# Patient Record
Sex: FEMALE | Race: ASIAN | URBAN - METROPOLITAN AREA
[De-identification: names, ages, dates, MRNs, and addresses within clinical notes are randomized per-mention and may not be internally consistent; named-entity substitution may affect disease eponyms.]

---

## 2017-05-26 ENCOUNTER — OFFICE VISIT (OUTPATIENT)
Dept: FAMILY MEDICINE CLINIC | Age: 42
End: 2017-05-26

## 2017-05-26 VITALS
RESPIRATION RATE: 18 BRPM | TEMPERATURE: 97.4 F | OXYGEN SATURATION: 99 % | DIASTOLIC BLOOD PRESSURE: 71 MMHG | HEART RATE: 64 BPM | SYSTOLIC BLOOD PRESSURE: 107 MMHG | BODY MASS INDEX: 22.68 KG/M2 | HEIGHT: 63 IN | WEIGHT: 128 LBS

## 2017-05-26 DIAGNOSIS — L70.0 CYSTIC ACNE: ICD-10-CM

## 2017-05-26 DIAGNOSIS — M54.2 NECK PAIN: ICD-10-CM

## 2017-05-26 DIAGNOSIS — E03.9 ACQUIRED HYPOTHYROIDISM: Primary | ICD-10-CM

## 2017-05-26 RX ORDER — LEVOTHYROXINE SODIUM 50 UG/1
50 TABLET ORAL
Qty: 30 TAB | Refills: 1 | Status: SHIPPED | OUTPATIENT
Start: 2017-05-26 | End: 2017-06-18 | Stop reason: SDUPTHER

## 2017-05-26 RX ORDER — CLINDAMYCIN AND BENZOYL PEROXIDE 10; 50 MG/G; MG/G
GEL TOPICAL 2 TIMES DAILY
Qty: 1 TUBE | Refills: 3 | Status: SHIPPED | OUTPATIENT
Start: 2017-05-26

## 2017-05-26 RX ORDER — DICLOFENAC SODIUM 10 MG/G
GEL TOPICAL 4 TIMES DAILY
Qty: 100 G | Refills: 0 | Status: SHIPPED | OUTPATIENT
Start: 2017-05-26

## 2017-05-26 NOTE — PROGRESS NOTES
Chief Complaint   Patient presents with    Medication Refill     1. Have you been to the ER, urgent care clinic since your last visit? Hospitalized since your last visit? No    2. Have you seen or consulted any other health care providers outside of the [address] [place] [address] since your last visit? Include any pap smears or colon screening.  No

## 2017-05-26 NOTE — PROGRESS NOTES
Subjective  Lindy Ana Piña is an 38 y.o. female who presents for follow up:    Hypothyroid: compliant with Synthroid 50mcg daily, last TSH checked 11/2016 normal.      Acne: would like to change from Differin gel to Benzaclin. Daughter uses Benzaclin and pt thinks it may work better for her. Neck pain: Doesn't recall pulling a muscle but thinks she must have. Has tried OTC Nsaids and tylenol without relief. Feels achy. Sometimes radiates up towards her head causing an occipital headache. No numbness or tingling. Allergies - reviewed:   No Known Allergies      Medications - reviewed:   Current Outpatient Prescriptions   Medication Sig    levothyroxine (SYNTHROID) 50 mcg tablet Take 1 Tab by mouth Daily (before breakfast).  clindamycin-benzoyl peroxide (BENZACLIN) 1-5 % topical gel Apply  to affected area two (2) times a day. Apply to affected area after the skin has been cleansed and dried.  diclofenac (VOLTAREN) 1 % gel Apply  to affected area four (4) times daily.  SUMAtriptan (IMITREX) 50 mg tablet Take 1 Tab by mouth once as needed for Migraine.  butalbital-acetaminophen (PHRENILIN)  mg tablet Take 1 Tab by mouth every six (6) hours as needed. For migraine    ZOLMitriptan (ZOMIG) 2.5 mg tablet Take 1 Tab by mouth daily as needed. For migraine     No current facility-administered medications for this visit. Past Medical History - reviewed:  Past Medical History:   Diagnosis Date    Headache     Hypothyroidism          Past Surgical History - reviewed:   History reviewed. No pertinent surgical history. Social History - reviewed:  Social History     Social History    Marital status:      Spouse name: N/A    Number of children: N/A    Years of education: N/A     Occupational History    Not on file.      Social History Main Topics    Smoking status: Never Smoker    Smokeless tobacco: Not on file    Alcohol use No    Drug use: No    Sexual activity: Yes     Partners: Male     Birth control/ protection: None     Other Topics Concern    Not on file     Social History Narrative    [employer]    12and 12year olds         Family History - reviewed:  Family History   Problem Relation Age of Onset    Heart Disease Father          Immunizations - reviewed: There is no immunization history on file for this patient. ROS  CONSTITUTIONAL: Denies: fever, chills  CARDIOVASCULAR: Denies: chest pain, edema, palpitations  RESPIRATORY: Denies: shortness of breath, pleuritic chest pain  MUSCULOSKELETAL: joint pain, muscle pain      Physical Exam  Visit Vitals    /71 (BP 1 Location: Right arm, BP Patient Position: Sitting)    Pulse 64    Temp 97.4 °F (36.3 °C) (Oral)    Resp 18    Ht 5' 3\" (1.6 m)    Wt 128 lb (58.1 kg)    LMP 05/08/2017 (Approximate)    SpO2 99%    BMI 22.67 kg/m2       General appearance - alert, well appearing, and in no distress  Neck - supple, no significant adenopathy  Chest - clear to auscultation, no wheezes, rales or rhonchi, symmetric air entry  Heart - normal rate, regular rhythm, normal S1, S2, no murmurs, rubs, clicks or gallops  Neurological - alert, oriented, normal speech, no focal findings or movement disorder noted  Musculoskeletal - upper cervical paraspinal muscles are hypertrophic and tight, mildly spasmed, no spinous process TTP, normal ROM      Assessment/Plan    ICD-10-CM ICD-9-CM    1. Acquired hypothyroidism E03.9 244.9 TSH 3RD GENERATION      levothyroxine (SYNTHROID) 50 mcg tablet   2. Cystic acne L70.0 706.1 clindamycin-benzoyl peroxide (BENZACLIN) 1-5 % topical gel   3. Neck pain M54.2 723.1 diclofenac (VOLTAREN) 1 % gel     · Will check TSH and adjust medication as necessary   · Will change Differin gel to Benzaclin per pt's request for her acne  · Suspect neck pain 2/2 muscle strain. Has failed OTC NSAIDs, will try Diclofenac. Offered muscle relaxer but pt declined.   Home exercise program.      Follow-up Disposition: Not on File      I have discussed the diagnosis with the patient and the intended plan as seen in the above orders. The patient has received an after-visit summary and questions were answered concerning future plans. I have discussed medication side effects and warnings with the patient as well.       Danis Joe, DO

## 2017-06-16 ENCOUNTER — LAB ONLY (OUTPATIENT)
Dept: FAMILY MEDICINE CLINIC | Age: 42
End: 2017-06-16

## 2017-06-16 DIAGNOSIS — E03.9 ACQUIRED HYPOTHYROIDISM: ICD-10-CM

## 2017-06-17 LAB — TSH SERPL DL<=0.005 MIU/L-ACNC: 2.79 UIU/ML (ref 0.45–4.5)

## 2017-06-18 DIAGNOSIS — E03.9 ACQUIRED HYPOTHYROIDISM: ICD-10-CM

## 2017-06-18 RX ORDER — LEVOTHYROXINE SODIUM 50 UG/1
50 TABLET ORAL
Qty: 90 TAB | Refills: 3 | Status: SHIPPED | OUTPATIENT
Start: 2017-06-18 | End: 2018-08-24 | Stop reason: SDUPTHER

## 2017-11-17 ENCOUNTER — OFFICE VISIT (OUTPATIENT)
Dept: FAMILY MEDICINE CLINIC | Age: 42
End: 2017-11-17

## 2017-11-17 VITALS
SYSTOLIC BLOOD PRESSURE: 116 MMHG | HEIGHT: 63 IN | OXYGEN SATURATION: 100 % | WEIGHT: 123 LBS | HEART RATE: 58 BPM | RESPIRATION RATE: 16 BRPM | BODY MASS INDEX: 21.79 KG/M2 | DIASTOLIC BLOOD PRESSURE: 77 MMHG | TEMPERATURE: 98.4 F

## 2017-11-17 DIAGNOSIS — R50.9 FEVER, UNSPECIFIED FEVER CAUSE: ICD-10-CM

## 2017-11-17 DIAGNOSIS — G43.809 OTHER MIGRAINE WITHOUT STATUS MIGRAINOSUS, NOT INTRACTABLE: ICD-10-CM

## 2017-11-17 DIAGNOSIS — R52 BODY ACHES: Primary | ICD-10-CM

## 2017-11-17 DIAGNOSIS — R21 RASH: ICD-10-CM

## 2017-11-17 RX ORDER — CLOTRIMAZOLE AND BETAMETHASONE DIPROPIONATE 10; .64 MG/G; MG/G
CREAM TOPICAL
Qty: 15 G | Refills: 0 | Status: SHIPPED | OUTPATIENT
Start: 2017-11-17

## 2017-11-17 RX ORDER — SUMATRIPTAN 50 MG/1
50 TABLET, FILM COATED ORAL
Qty: 10 TAB | Refills: 1 | Status: SHIPPED | OUTPATIENT
Start: 2017-11-17 | End: 2019-04-09 | Stop reason: SDUPTHER

## 2017-11-17 RX ORDER — BUTALBITAL AND ACETAMINOPHEN 325; 50 MG/1; MG/1
1 TABLET ORAL
Qty: 10 TAB | Refills: 1 | Status: SHIPPED | OUTPATIENT
Start: 2017-11-17 | End: 2019-04-09 | Stop reason: SDUPTHER

## 2017-11-17 NOTE — PROGRESS NOTES
Chief Complaint:  Chief Complaint   Patient presents with    Headache    Fever     since last night        HPI  Chirag Gordon is a 38 y.o. female who presents for multiple concerns. Temp at home 100 yesterday and today when checked underneath the arm  Also having some mild muscle aches throughout body  Denies other sxs  Sick contacts: none known  Got the flu shot 2 days ago and thinks this may be why  Needs note for missing work today  No vomiting, able to drink well    Has rash on her left chest and right arm. The one on chest is more chronic. The one on right arm is relatively new. It is itchy  Otc steroid cream hasn't made it go away    Migraines for years. Needs med refill. Starts in back of head and goes toward front  Lights and sound bothersome  No aura  Has N/V associated at times  Migraines are at typical frequency, hasn't gotten worse  Takes phrenilin 1x per 1-2 weeks, which helps prevent migraine from getting worse  Without taking the phrenilin, migraine gets worse and needs sumatriptan  Never uses tylenol or ibuprofen because they don't help  Used imitrex 2-3x in the last 3 months    ROS:   No fever  No dizziness or confusion  No vision changes, No sore throat, no congestion or runny nose  No cough or SOB  No chest pain  No abdominal pain, N/V, constipation, diarrhea    Allergies:   No Known Allergies    Meds:   Current Outpatient Prescriptions   Medication Sig Dispense Refill    SUMAtriptan (IMITREX) 50 mg tablet Take 1 Tab by mouth once as needed for Migraine. 10 Tab 1    butalbital-acetaminophen (PHRENILIN)  mg tablet Take 1 Tab by mouth every six (6) hours as needed. For migraine 10 Tab 1    clotrimazole-betamethasone (LOTRISONE) topical cream Apply thin layer twice daily for up to 2 weeks 15 g 0    levothyroxine (SYNTHROID) 50 mcg tablet Take 1 Tab by mouth Daily (before breakfast).  90 Tab 3    clindamycin-benzoyl peroxide (BENZACLIN) 1-5 % topical gel Apply  to affected area two (2) times a day. Apply to affected area after the skin has been cleansed and dried. 1 Tube 3    diclofenac (VOLTAREN) 1 % gel Apply  to affected area four (4) times daily. 100 g 0       PMH: reviewed  Past Medical History:   Diagnosis Date    Headache     Hypothyroidism        SH: reviewed  Smoker:  History   Smoking Status    Never Smoker   Smokeless Tobacco    Not on file       ETOH:   History   Alcohol Use No       Physical Exam:  Visit Vitals    /77 (BP 1 Location: Left arm, BP Patient Position: Sitting)    Pulse (!) 58    Temp 98.4 °F (36.9 °C) (Oral)    Resp 16    Ht 5' 3\" (1.6 m)    Wt 123 lb (55.8 kg)    SpO2 100%    BMI 21.79 kg/m2     Gen: No apparent distress. Pleasant. HEENT: Normocephalic, pupils equally round and reactive, extraocular eye movements intact, hearing grossly normal bilaterally, bilateral TMs normal, nose normal and patent with no erythema, mucous membranes moist, pharynx normal without lesions  Neck: Supple, no lymph nodes, masses, or thyromegaly appreciated  Lungs: Respirations unlabored, clear to auscultation bilaterally  Cardio: Regular rate and rhythm without murmurs, rubs, or gallops   Abdomen: Normoactive bowel sounds, soft, nontender, nondistended  Ext: No peripheral edema, erythema, or tenderness  Skin: Warm and dry, there are two dry, mildly erythematous patches on skin, one is approx 1-cm and circular over inner aspect of right upper arm, one is approx 1.5-cm and circular near left clavicle  Neuro: Alert and responds to all questions appropriately. CN 2-12, sensation, strength, gait grossly intact. Assessment and Plan:     Encounter Diagnoses:    ICD-10-CM ICD-9-CM    1. Body aches R52 780.96    2. Fever, unspecified fever cause R50.9 780.60    3. Rash R21 782.1    4. Other migraine without status migrainosus, not intractable G43.809 346.80      1-2. Likely viral vs SE of flu vaccine. TSH wnl in June 2017.  Afebrile, VSS, reassuring exam. Supportive care recommended, including adequate hydration and tylenol/motrin prn. Rtc prn. Work note provided. 3. Trial lotrisone. 4. Migraines stable. Refilled current prescription. Would not recommend phrenilin indefinitely, however. Reviewed that she may benefit from alternate regimen and that another appt would be needed to discuss beyond this refill. Rtc prn if migraine intensity or frequency worsens or if she is using phrenilin frequently (last rx written August 2016). Discussed diagnoses in detail with patient. Patient expressed understanding of and agreement to above plan. All questions and concerns addressed. Medication risks/benefits/side effects discussed with patient. Patient is counseled to return to the office if symptoms do not improve as expected.       Roosevelt Buenrostro MD  Family Medicine Resident, PGY-3

## 2017-11-17 NOTE — MR AVS SNAPSHOT
Visit Information Date & Time Provider Department Dept. Phone Encounter #  
 11/17/2017  2:45 PM Brent Srivastava, Paresh5 [facility] [phone] [id number] Follow-up Instructions Return if symptoms worsen or fail to improve. Upcoming Health Maintenance Date Due DTaP/Tdap/Td series (1 - Tdap) 3/9/1998 PAP AKA CERVICAL CYTOLOGY 3/9/1998 Influenza Age 5 to Adult 8/1/2017 Allergies as of 11/17/2017  Review Complete On: 11/17/2017 By: Brent Srivastava MD  
 No Known Allergies Current Immunizations  Never Reviewed No immunizations on file. Not reviewed this visit You Were Diagnosed With   
  
 Codes Comments Body aches    -  Primary ICD-10-CM: V00 ICD-9-CM: 780.96 Rash     ICD-10-CM: R21 
ICD-9-CM: 782.1 Other migraine without status migrainosus, not intractable     ICD-10-CM: W23.757 ICD-9-CM: 346.80 Vitals BP Pulse Temp Resp Height(growth percentile) Weight(growth percentile) 116/77 (BP 1 Location: Left arm, BP Patient Position: Sitting) (!) 58 98.4 °F (36.9 °C) (Oral) 16 5' 3\" (1.6 m) 123 lb (55.8 kg) SpO2 BMI OB Status Smoking Status 100% 21.79 kg/m2 Having regular periods Never Smoker Vitals History BMI and BSA Data Body Mass Index Body Surface Area 21.79 kg/m 2 1.57 m 2 Preferred Pharmacy Pharmacy Name Phone [facility] [address] AT  [address] [phone] Your Updated Medication List  
  
   
This list is accurate as of: 11/17/17  4:03 PM.  Always use your most recent med list.  
  
  
  
  
 butalbital-acetaminophen  mg tablet Commonly known as:  Arina Bumps Take 1 Tab by mouth every six (6) hours as needed. For migraine  
  
 clindamycin-benzoyl peroxide 1-5 % topical gel Commonly known as:  [place] Apply  to affected area two (2) times a day.  Apply to affected area after the skin has been cleansed and dried. clotrimazole-betamethasone topical cream  
Commonly known as:  Hope Lipa Apply thin layer twice daily for up to 2 weeks  
  
 diclofenac 1 % Gel Commonly known as:  VOLTAREN Apply  to affected area four (4) times daily. levothyroxine 50 mcg tablet Commonly known as:  synthroid Take 1 Tab by mouth Daily (before breakfast). SUMAtriptan 50 mg tablet Commonly known as:  IMITREX Take 1 Tab by mouth once as needed for Migraine. Prescriptions Sent to Pharmacy Refills SUMAtriptan (IMITREX) 50 mg tablet 1 Sig: Take 1 Tab by mouth once as needed for Migraine. Class: Normal  
 Pharmacy: [place] [address] Ph #: [phone] Route: Oral  
 butalbital-acetaminophen (PHRENILIN)  mg tablet 1 Sig: Take 1 Tab by mouth every six (6) hours as needed. For migraine Class: Normal  
 Pharmacy: [place] [address] Ph #: [phone] Route: Oral  
 clotrimazole-betamethasone (LOTRISONE) topical cream 0 Sig: Apply thin layer twice daily for up to 2 weeks Class: Normal  
 Pharmacy: [place] [address] Ph #: [phone] Follow-up Instructions Return if symptoms worsen or fail to improve. Patient Instructions Viral Infections: Care Instructions Your Care Instructions You don't feel well, but it's not clear what's causing it. You may have a viral infection. Viruses cause many illnesses, such as the common cold, influenza, fever, rashes, and the diarrhea, nausea, and vomiting that are often called \"stomach flu. \" You may wonder if antibiotic medicines could make you feel better. But antibiotics only treat infections caused by bacteria. They don't work on viruses. The good news is that viral infections usually aren't serious. Most will go away in a few days without medical treatment. In the meantime, there are a few things you can do to make yourself more comfortable. Follow-up care is a key part of your treatment and safety. Be sure to make and go to all appointments, and call your doctor if you are having problems. It's also a good idea to know your test results and keep a list of the medicines you take. How can you care for yourself at home? · Get plenty of rest if you feel tired. · Take an over-the-counter pain medicine if needed, such as acetaminophen (Tylenol), ibuprofen (Advil, Motrin), or naproxen (Aleve). Read and follow all instructions on the label. · Be careful when taking over-the-counter cold or flu medicines and Tylenol at the same time. Many of these medicines have acetaminophen, which is Tylenol. Read the labels to make sure that you are not taking more than the recommended dose. Too much acetaminophen (Tylenol) can be harmful. · Drink plenty of fluids, enough so that your urine is light yellow or clear like water. If you have kidney, heart, or liver disease and have to limit fluids, talk with your doctor before you increase the amount of fluids you drink. · Stay home from work, school, and other public places while you have a fever. When should you call for help? Call 911 anytime you think you may need emergency care. For example, call if: 
? · You have severe trouble breathing. ? · You passed out (lost consciousness). ?Call your doctor now or seek immediate medical care if: 
? · You seem to be getting much sicker. ? · You have a new or higher fever. ? · You have blood in your stools. ? · You have new belly pain, or your pain gets worse. ? · You have a new rash. ? Watch closely for changes in your health, and be sure to contact your doctor if: 
? · You start to get better and then get worse. ? · You do not get better as expected. Where can you learn more? Go to http://anabella-breana.info/. Enter T073 in the search box to learn more about \"Viral Infections: Care Instructions. \" Current as of: March 3, 2017 Content Version: 11.4 © 3228-8779 [employer]. Care instructions adapted under license by [employer] (which disclaims liability or warranty for this information). If you have questions about a medical condition or this instruction, always ask your healthcare professional. [address] any warranty or liability for your use of this information. Introducing [facility]! Dear Dwain Dela Cruz: Thank you for requesting a [employer] account. Our records indicate that you already have an active [employer] account. You can access your account anytime at [email or web address] Did you know that you can access your hospital and ER discharge instructions at any time in [employer]? You can also review all of your test results from your hospital stay or ER visit. Additional Information If you have questions, please visit the Frequently Asked Questions section of the [employer] website at [email or web address] Remember, [employer] is NOT to be used for urgent needs. For medical emergencies, dial 911. Now available from your iPhone and Android! Please provide this summary of care documentation to your next provider. Your primary care clinician is listed as Beau Cross. If you have any questions after today's visit, please call [phone].

## 2017-11-17 NOTE — PATIENT INSTRUCTIONS
Viral Infections: Care Instructions  Your Care Instructions    You don't feel well, but it's not clear what's causing it. You may have a viral infection. Viruses cause many illnesses, such as the common cold, influenza, fever, rashes, and the diarrhea, nausea, and vomiting that are often called \"stomach flu. \" You may wonder if antibiotic medicines could make you feel better. But antibiotics only treat infections caused by bacteria. They don't work on viruses. The good news is that viral infections usually aren't serious. Most will go away in a few days without medical treatment. In the meantime, there are a few things you can do to make yourself more comfortable. Follow-up care is a key part of your treatment and safety. Be sure to make and go to all appointments, and call your doctor if you are having problems. It's also a good idea to know your test results and keep a list of the medicines you take. How can you care for yourself at home? · Get plenty of rest if you feel tired. · Take an over-the-counter pain medicine if needed, such as acetaminophen (Tylenol), ibuprofen (Advil, Motrin), or naproxen (Aleve). Read and follow all instructions on the label. · Be careful when taking over-the-counter cold or flu medicines and Tylenol at the same time. Many of these medicines have acetaminophen, which is Tylenol. Read the labels to make sure that you are not taking more than the recommended dose. Too much acetaminophen (Tylenol) can be harmful. · Drink plenty of fluids, enough so that your urine is light yellow or clear like water. If you have kidney, heart, or liver disease and have to limit fluids, talk with your doctor before you increase the amount of fluids you drink. · Stay home from work, school, and other public places while you have a fever. When should you call for help? Call 911 anytime you think you may need emergency care. For example, call if:  ? · You have severe trouble breathing.    ? · You passed out (lost consciousness). ?Call your doctor now or seek immediate medical care if:  ? · You seem to be getting much sicker. ? · You have a new or higher fever. ? · You have blood in your stools. ? · You have new belly pain, or your pain gets worse. ? · You have a new rash. ? Watch closely for changes in your health, and be sure to contact your doctor if:  ? · You start to get better and then get worse. ? · You do not get better as expected. Where can you learn more? Go to http://anabella-breana.info/. Enter M674 in the search box to learn more about \"Viral Infections: Care Instructions. \"  Current as of: March 3, 2017  Content Version: 11.4  © 8903-7258 [employer]. Care instructions adapted under license by [employer] (which disclaims liability or warranty for this information). If you have questions about a medical condition or this instruction, always ask your healthcare professional. [address] any warranty or liability for your use of this information.

## 2017-11-17 NOTE — LETTER
NOTIFICATION RETURN TO WORK / SCHOOL 
 
11/17/2017 3:47 PM 
 
Ms. Lucas Grant [address] [place] [phone] To Whom It May Concern: 
 
Lucas Grant is currently under the care of [facility]. Please excuse her absence on 11/17/17. She will return to work on 11/20/17. If there are questions or concerns please have the patient contact our office. Sincerely, Quin Roca MD

## 2017-11-28 ENCOUNTER — OFFICE VISIT (OUTPATIENT)
Dept: FAMILY MEDICINE CLINIC | Age: 42
End: 2017-11-28

## 2017-11-28 VITALS
TEMPERATURE: 98.1 F | RESPIRATION RATE: 18 BRPM | WEIGHT: 122 LBS | HEIGHT: 63 IN | HEART RATE: 54 BPM | BODY MASS INDEX: 21.62 KG/M2 | OXYGEN SATURATION: 98 % | DIASTOLIC BLOOD PRESSURE: 71 MMHG | SYSTOLIC BLOOD PRESSURE: 118 MMHG

## 2017-11-28 DIAGNOSIS — R35.0 URINARY FREQUENCY: Primary | ICD-10-CM

## 2017-11-28 DIAGNOSIS — R21 SKIN RASH: ICD-10-CM

## 2017-11-28 DIAGNOSIS — N30.01 ACUTE CYSTITIS WITH HEMATURIA: ICD-10-CM

## 2017-11-28 LAB
BILIRUB UR QL STRIP: NEGATIVE
GLUCOSE UR-MCNC: NEGATIVE MG/DL
HCG URINE, QL. (POC): NEGATIVE
KETONES P FAST UR STRIP-MCNC: NEGATIVE MG/DL
PH UR STRIP: 7 [PH] (ref 4.6–8)
PROT UR QL STRIP: NEGATIVE
SP GR UR STRIP: 1.01 (ref 1–1.03)
UA UROBILINOGEN AMB POC: NORMAL (ref 0.2–1)
URINALYSIS CLARITY POC: NORMAL
URINALYSIS COLOR POC: NORMAL
URINE BLOOD POC: NORMAL
URINE LEUKOCYTES POC: NORMAL
URINE NITRITES POC: POSITIVE
VALID INTERNAL CONTROL?: YES

## 2017-11-28 RX ORDER — CIPROFLOXACIN 250 MG/1
250 TABLET, FILM COATED ORAL EVERY 12 HOURS
Qty: 6 TAB | Refills: 0 | Status: SHIPPED | OUTPATIENT
Start: 2017-11-28 | End: 2017-12-01

## 2017-11-28 NOTE — PROGRESS NOTES
Chief Complaint   Patient presents with    Urinary Burning     x 3 days    Rash     x 3 weeks     1. Have you been to the ER, urgent care clinic since your last visit? Hospitalized since your last visit? No    2. Have you seen or consulted any other health care providers outside of the [address] [place] [address] since your last visit? Include any pap smears or colon screening.  No

## 2017-11-28 NOTE — MR AVS SNAPSHOT
Visit Information Date & Time Provider Department Dept. Phone Encounter #  
 11/28/2017  4:00 PM Sher Pal, [facility] [phone] [id number] Follow-up Instructions Return if symptoms worsen or fail to improve. Upcoming Health Maintenance Date Due  
 PAP AKA CERVICAL CYTOLOGY 11/28/2018* DTaP/Tdap/Td series (2 - Td) 11/28/2027 *Topic was postponed. The date shown is not the original due date. Allergies as of 11/28/2017  Review Complete On: 11/28/2017 By: Emely Reyna No Known Allergies Current Immunizations  Never Reviewed No immunizations on file. Not reviewed this visit You Were Diagnosed With   
  
 Codes Comments Urinary frequency    -  Primary ICD-10-CM: R35.0 ICD-9-CM: 788.41 Acute cystitis with hematuria     ICD-10-CM: N30.01 
ICD-9-CM: 595.0 Skin rash     ICD-10-CM: R21 
ICD-9-CM: 782.1 Vitals BP Pulse Temp Resp Height(growth percentile) Weight(growth percentile) 118/71 (BP 1 Location: Right arm, BP Patient Position: Sitting) (!) 54 98.1 °F (36.7 °C) (Oral) 18 5' 3\" (1.6 m) 122 lb (55.3 kg) LMP SpO2 BMI OB Status Smoking Status 11/22/2017 (Approximate) 98% 21.61 kg/m2 Unknown Never Smoker BMI and BSA Data Body Mass Index Body Surface Area  
 21.61 kg/m 2 1.57 m 2 Preferred Pharmacy Pharmacy Name Phone [facility] [address] AT  [address] [phone] Your Updated Medication List  
  
   
This list is accurate as of: 11/28/17  4:47 PM.  Always use your most recent med list.  
  
  
  
  
 butalbital-acetaminophen  mg tablet Commonly known as:  Travon Divine Take 1 Tab by mouth every six (6) hours as needed. For migraine  
  
 ciprofloxacin HCl 250 mg tablet Commonly known as:  CIPRO Take 1 Tab by mouth every twelve (12) hours for 3 days. clindamycin-benzoyl peroxide 1-5 % topical gel Commonly known as:  Sebastian Roche Apply  to affected area two (2) times a day. Apply to affected area after the skin has been cleansed and dried. clotrimazole-betamethasone topical cream  
Commonly known as:  Hope Lipa Apply thin layer twice daily for up to 2 weeks  
  
 diclofenac 1 % Gel Commonly known as:  VOLTAREN Apply  to affected area four (4) times daily. levothyroxine 50 mcg tablet Commonly known as:  synthroid Take 1 Tab by mouth Daily (before breakfast). SUMAtriptan 50 mg tablet Commonly known as:  IMITREX Take 1 Tab by mouth once as needed for Migraine. Prescriptions Sent to Pharmacy Refills  
 ciprofloxacin HCl (CIPRO) 250 mg tablet 0 Sig: Take 1 Tab by mouth every twelve (12) hours for 3 days. Class: Normal  
 Pharmacy: [facility] AT [address] #: [phone] Route: Oral  
  
We Performed the Following AMB POC URINALYSIS DIP STICK AUTO W/O MICRO [27255 CPT(R)] AMB POC URINE PREGNANCY TEST, VISUAL COLOR COMPARISON [60619 CPT(R)] Follow-up Instructions Return if symptoms worsen or fail to improve. Patient Instructions Urinary Tract Infection in Women: Care Instructions Your Care Instructions A urinary tract infection, or UTI, is a general term for an infection anywhere between the kidneys and the urethra (where urine comes out). Most UTIs are bladder infections. They often cause pain or burning when you urinate. UTIs are caused by bacteria and can be cured with antibiotics. Be sure to complete your treatment so that the infection goes away. Follow-up care is a key part of your treatment and safety. Be sure to make and go to all appointments, and call your doctor if you are having problems. It's also a good idea to know your test results and keep a list of the medicines you take. How can you care for yourself at home? · Take your antibiotics as directed. Do not stop taking them just because you feel better. You need to take the full course of antibiotics. · Drink extra water and other fluids for the next day or two. This may help wash out the bacteria that are causing the infection. (If you have kidney, heart, or liver disease and have to limit fluids, talk with your doctor before you increase your fluid intake.) · Avoid drinks that are carbonated or have caffeine. They can irritate the bladder. · Urinate often. Try to empty your bladder each time. · To relieve pain, take a hot bath or lay a heating pad set on low over your lower belly or genital area. Never go to sleep with a heating pad in place. To prevent UTIs · Drink plenty of water each day. This helps you urinate often, which clears bacteria from your system. (If you have kidney, heart, or liver disease and have to limit fluids, talk with your doctor before you increase your fluid intake.) · Urinate when you need to. · Urinate right after you have sex. · Change sanitary pads often. · Avoid douches, bubble baths, feminine hygiene sprays, and other feminine hygiene products that have deodorants. · After going to the bathroom, wipe from front to back. When should you call for help? Call your doctor now or seek immediate medical care if: 
? · Symptoms such as fever, chills, nausea, or vomiting get worse or appear for the first time. ? · You have new pain in your back just below your rib cage. This is called flank pain. ? · There is new blood or pus in your urine. ? · You have any problems with your antibiotic medicine. ? Watch closely for changes in your health, and be sure to contact your doctor if: 
? · You are not getting better after taking an antibiotic for 2 days. ? · Your symptoms go away but then come back. Where can you learn more? Go to http://anabella-breana.info/. Enter S550 in the search box to learn more about \"Urinary Tract Infection in Women: Care Instructions. \" Current as of: May 12, 2017 Content Version: 11.4 © 5107-9512 YesVideo. Care instructions adapted under license by [employer] (which disclaims liability or warranty for this information). If you have questions about a medical condition or this instruction, always ask your healthcare professional. [address] any warranty or liability for your use of this information. Introducing hospitals & HEALTH SERVICES! Dear Julianne Saenz: Thank you for requesting a Extraprise account. Our records indicate that you already have an active Extraprise account. You can access your account anytime at [email or web address] Did you know that you can access your hospital and ER discharge instructions at any time in Extraprise? You can also review all of your test results from your hospital stay or ER visit. Additional Information If you have questions, please visit the Frequently Asked Questions section of the Extraprise website at [email or web address] Remember, Extraprise is NOT to be used for urgent needs. For medical emergencies, dial 911. Now available from your iPhone and Android! Please provide this summary of care documentation to your next provider. Your primary care clinician is listed as Mendel Restrepo. If you have any questions after today's visit, please call [phone].

## 2017-11-28 NOTE — PATIENT INSTRUCTIONS

## 2017-11-28 NOTE — PROGRESS NOTES
History of Present Illness:     Chief Complaint   Patient presents with    Urinary Burning     x 3 days    Rash     x 3 weeks     Pt is a 38y.o. year old female    Presents to clinic for 3 day history of pain with urination. Used OTC Azo without relief. She is having burning and pain with urination. Also has urgency. Urine is dark yellow, no blood. +Chills. Patient's last menstrual period was 11/22/2017 (approximate). Currently sexually active.  s/p vasectomy. Still having issues with rash on neck, arms and torso. Getting some relief from Lotrisone cream.      ROS:   Denies fever  Denies back pain  Denies nausea or vomiting. Past Medical History:   Diagnosis Date    Headache     Hypothyroidism          Current Outpatient Prescriptions on File Prior to Visit   Medication Sig Dispense Refill    SUMAtriptan (IMITREX) 50 mg tablet Take 1 Tab by mouth once as needed for Migraine. 10 Tab 1    butalbital-acetaminophen (PHRENILIN)  mg tablet Take 1 Tab by mouth every six (6) hours as needed. For migraine 10 Tab 1    clotrimazole-betamethasone (LOTRISONE) topical cream Apply thin layer twice daily for up to 2 weeks 15 g 0    levothyroxine (SYNTHROID) 50 mcg tablet Take 1 Tab by mouth Daily (before breakfast). 90 Tab 3    clindamycin-benzoyl peroxide (BENZACLIN) 1-5 % topical gel Apply  to affected area two (2) times a day. Apply to affected area after the skin has been cleansed and dried. 1 Tube 3    diclofenac (VOLTAREN) 1 % gel Apply  to affected area four (4) times daily. 100 g 0     No current facility-administered medications on file prior to visit.           Allergies:  No Known Allergies      Objective:     Vitals:    11/28/17 1625   BP: 118/71   Pulse: (!) 54   Resp: 18   Temp: 98.1 °F (36.7 °C)   TempSrc: Oral   SpO2: 98%   Weight: 122 lb (55.3 kg)   Height: 5' 3\" (1.6 m)       Physical Exam:  General appearance - alert, well appearing, and in no distress  Skin - Scattered erythematous maculo-papular lesions on upper arms, neck and abdomen. Some have raised boarders with central clearing. Others are dry and flaky. Recent Labs:  Recent Results (from the past 12 hour(s))   AMB POC URINALYSIS DIP STICK AUTO W/O MICRO    Collection Time: 11/28/17  4:33 PM   Result Value Ref Range    Color (UA POC) Saumya     Clarity (UA POC) Slightly Cloudy     Glucose (UA POC) Negative Negative    Bilirubin (UA POC) Negative Negative    Ketones (UA POC) Negative Negative    Specific gravity (UA POC) 1.015 1.001 - 1.035    Blood (UA POC) Trace Negative    pH (UA POC) 7.0 4.6 - 8.0    Protein (UA POC) Negative Negative    Urobilinogen (UA POC) 1 mg/dL 0.2 - 1    Nitrites (UA POC) Positive Negative    Leukocyte esterase (UA POC) 1+ Negative   AMB POC URINE PREGNANCY TEST, VISUAL COLOR COMPARISON    Collection Time: 11/28/17  4:37 PM   Result Value Ref Range    VALID INTERNAL CONTROL POC Yes     HCG urine, Ql. (POC) Negative Negative         Assessment and Plan:   Pt is a 38y.o. year old female,      ICD-10-CM ICD-9-CM    1. Urinary frequency R35.0 788.41 AMB POC URINALYSIS DIP STICK AUTO W/O MICRO      AMB POC URINE PREGNANCY TEST, VISUAL COLOR COMPARISON   2. Acute cystitis with hematuria N30.01 595.0 ciprofloxacin HCl (CIPRO) 250 mg tablet   3. Skin rash R21 782.1      Treat UTI with Cipro x 3 days    Continue using Lotrisone cream for rash    Follow up PRSIOMARA Davis, MD      I have discussed the diagnosis with the patient and the intended plan as seen in the above orders. The patient has received an after-visit summary and questions were answered concerning future plans.

## 2018-08-24 ENCOUNTER — OFFICE VISIT (OUTPATIENT)
Dept: FAMILY MEDICINE CLINIC | Age: 43
End: 2018-08-24

## 2018-08-24 VITALS
OXYGEN SATURATION: 100 % | SYSTOLIC BLOOD PRESSURE: 117 MMHG | BODY MASS INDEX: 22.15 KG/M2 | HEART RATE: 59 BPM | WEIGHT: 125 LBS | HEIGHT: 63 IN | RESPIRATION RATE: 16 BRPM | DIASTOLIC BLOOD PRESSURE: 78 MMHG | TEMPERATURE: 97.9 F

## 2018-08-24 DIAGNOSIS — E03.9 ACQUIRED HYPOTHYROIDISM: Primary | ICD-10-CM

## 2018-08-24 RX ORDER — LEVOTHYROXINE SODIUM 50 UG/1
50 TABLET ORAL
Qty: 14 TAB | Refills: 0 | Status: SHIPPED | OUTPATIENT
Start: 2018-08-24 | End: 2018-08-27 | Stop reason: SDUPTHER

## 2018-08-24 NOTE — MR AVS SNAPSHOT
[address] 
[phone] Patient: Anita Fraser MRN: [id number] :1975 Visit Information Date & Time Provider Department Dept. Phone Encounter #  
 2018  8:45 AM Stanley Goodman MD [facility] [phone] [id number] Upcoming Health Maintenance Date Due Influenza Age 5 to Adult 2018 PAP AKA CERVICAL CYTOLOGY 2018* DTaP/Tdap/Td series (2 - Td) 2027 *Topic was postponed. The date shown is not the original due date. Allergies as of 2018  Review Complete On: 2018 By: Shan Madison No Known Allergies Current Immunizations  Never Reviewed No immunizations on file. Not reviewed this visit You Were Diagnosed With   
  
 Codes Comments Acquired hypothyroidism    -  Primary ICD-10-CM: E03.9 ICD-9-CM: 257. 9 Vitals BP Pulse Temp Resp Height(growth percentile) Weight(growth percentile) 117/78 (!) 59 97.9 °F (36.6 °C) 16 5' 3\" (1.6 m) 125 lb (56.7 kg) SpO2 BMI OB Status Smoking Status 100% 22.14 kg/m2 Unknown Never Smoker BMI and BSA Data Body Mass Index Body Surface Area  
 22.14 kg/m 2 1.59 m 2 Preferred Pharmacy Pharmacy Name Phone [facility] [address] AT  [address]  [phone] Your Updated Medication List  
  
   
This list is accurate as of 18  9:16 AM.  Always use your most recent med list.  
  
  
  
  
 butalbital-acetaminophen  mg tablet Commonly known as:  Raven [place] Take 1 Tab by mouth every six (6) hours as needed. For migraine  
  
 clindamycin-benzoyl peroxide 1-5 % topical gel Commonly known as:  Tanvir Risser Apply  to affected area two (2) times a day. Apply to affected area after the skin has been cleansed and dried.   
  
 clotrimazole-betamethasone topical cream  
 Commonly known as:  Yolette Quarto Apply thin layer twice daily for up to 2 weeks  
  
 diclofenac 1 % Gel Commonly known as:  VOLTAREN Apply  to affected area four (4) times daily. levothyroxine 50 mcg tablet Commonly known as:  synthroid Take 1 Tab by mouth Daily (before breakfast). SUMAtriptan 50 mg tablet Commonly known as:  IMITREX Take 1 Tab by mouth once as needed for Migraine. We Performed the Following T4, FREE Q1956312 CPT(R)] TSH 3RD GENERATION [69995 CPT(R)] Introducing [facility] SERVICES! Dear Laurent Aaron: Thank you for requesting a [employer] account. Our records indicate that you already have an active [employer] account. You can access your account anytime at [email or web address] Did you know that you can access your hospital and ER discharge instructions at any time in Terrace Software? You can also review all of your test results from your hospital stay or ER visit. Additional Information If you have questions, please visit the Frequently Asked Questions section of the [employer] website at [email or web address] Remember, [employer] Software is NOT to be used for urgent needs. For medical emergencies, dial 911. Now available from your iPhone and Android! Please provide this summary of care documentation to your next provider. Your primary care clinician is listed as Sri Cormier. If you have any questions after today's visit, please call [phone].

## 2018-08-24 NOTE — PROGRESS NOTES
CC: Hypothyroidism, med refill      HPI:    Hypothyroidism  -currently takes synthroid 50mcg daily   -denies any cold intolerance, constipation, or weight changes  -reports having some mild fatigue     Review of Systems:    Constitutional: negative for Fever,chills  CV: negative for CP, palpitations  Resp: negative for SOB, Cough, Wheezing  GI: negative for abdominal pain, n/v/d/c   Endocrine: negative for weight changes, negative for heat or cold intolerance       Current Outpatient Prescriptions:     SUMAtriptan (IMITREX) 50 mg tablet, Take 1 Tab by mouth once as needed for Migraine. , Disp: 10 Tab, Rfl: 1    butalbital-acetaminophen (PHRENILIN)  mg tablet, Take 1 Tab by mouth every six (6) hours as needed. For migraine, Disp: 10 Tab, Rfl: 1    levothyroxine (SYNTHROID) 50 mcg tablet, Take 1 Tab by mouth Daily (before breakfast). , Disp: 90 Tab, Rfl: 3    clotrimazole-betamethasone (LOTRISONE) topical cream, Apply thin layer twice daily for up to 2 weeks, Disp: 15 g, Rfl: 0    clindamycin-benzoyl peroxide (BENZACLIN) 1-5 % topical gel, Apply  to affected area two (2) times a day. Apply to affected area after the skin has been cleansed and dried. , Disp: 1 Tube, Rfl: 3    diclofenac (VOLTAREN) 1 % gel, Apply  to affected area four (4) times daily. , Disp: 100 g, Rfl: 0    No Known Allergies      Past Medical History:   Diagnosis Date    Headache     Hypothyroidism           Social History     Social History    Marital status:      Spouse name: N/A    Number of children: N/A    Years of education: N/A     Occupational History    Not on file.      Social History Main Topics    Smoking status: Never Smoker    Smokeless tobacco: Never Used    Alcohol use No    Drug use: No    Sexual activity: Yes     Partners: Male     Birth control/ protection: None     Other Topics Concern    Not on file     Social History Narrative    In the [employer]    10and 12year olds          Family History Problem Relation Age of Onset    Heart Disease Father          Physical Exam:     Visit Vitals    /78    Pulse (!) 59    Temp 97.9 °F (36.6 °C)    Resp 16    Ht 5' 3\" (1.6 m)    Wt 125 lb (56.7 kg)    SpO2 100%    BMI 22.14 kg/m2       General appearance: alert, oriented, NAD   HEENT: PERRLA, moist mucus membranes, no LAD, no palpable thyroid nodules or goiters  CV: RRR, S1/S2 heard, no m/r/g  Resp: CTAB, no w/r/r  Abdominal: soft, non-tender to palpation, +BS  Extremities: no swelling or edema   Neuro: CN II-XII intact, normal bulk and tone, 5/5 strength throughout, sensation intact bilaterally  Skin: no visible rashes      Assessment/Plan:     1. Acquired hypothyroidism: Last TSH wnL at 2.79 on 5/2017. Patient asymptomatic on 50mcg synthroid daily.   - TSH 3RD GENERATION  - T4, FREE  - Continue synthroid at 50mcg daily, will adjust as needed if TSH is elevated.    - RTC in 6 months for repeat TSH      Patient discussed with Dr. Kriss Johnson MD  [facility] Medicine Resident

## 2018-08-25 ENCOUNTER — TELEPHONE (OUTPATIENT)
Dept: FAMILY MEDICINE CLINIC | Age: 43
End: 2018-08-25

## 2018-08-25 LAB
T4 FREE SERPL-MCNC: 1.27 NG/DL (ref 0.82–1.77)
TSH SERPL DL<=0.005 MIU/L-ACNC: 2.23 UIU/ML (ref 0.45–4.5)

## 2018-08-25 NOTE — PROGRESS NOTES
TSH and free T4 WNL. Will continue synthroid at current dose of 50mcg daily and repeat TSH in 6 months.

## 2018-08-25 NOTE — TELEPHONE ENCOUNTER
Attempted to call patient with regards to lab results. No answer. Left voice message.        Henrietta Belle MD   Family Medicine Resident

## 2018-08-27 ENCOUNTER — TELEPHONE (OUTPATIENT)
Dept: FAMILY MEDICINE CLINIC | Age: 43
End: 2018-08-27

## 2018-08-27 DIAGNOSIS — E03.9 ACQUIRED HYPOTHYROIDISM: ICD-10-CM

## 2018-08-27 RX ORDER — LEVOTHYROXINE SODIUM 50 UG/1
50 TABLET ORAL
Qty: 90 TAB | Refills: 1 | Status: SHIPPED | OUTPATIENT
Start: 2018-08-27 | End: 2019-04-09 | Stop reason: SDUPTHER

## 2018-08-27 NOTE — TELEPHONE ENCOUNTER
Prescription for synthroid 50 mcg for 90 days with 1 refill left at . Patient called and notified of this and states she will be by to pick it up.        Pinky Love MD  Family Medicine Resident

## 2018-08-27 NOTE — TELEPHONE ENCOUNTER
Patient called to check on her lab results to see if she needs her medication adjusted. I informed her per Dr. Peter Nicole notes that labs came back WNL and to continue the current dose of 50 MCG of the synthroid. Patient states that he only gave her a 2 week supply and will need another refill. Patient will need to  the hard copy in office. Please call patient when this is ready.

## 2018-09-10 ENCOUNTER — OFFICE VISIT (OUTPATIENT)
Dept: FAMILY MEDICINE CLINIC | Age: 43
End: 2018-09-10

## 2018-09-10 VITALS
BODY MASS INDEX: 23.57 KG/M2 | RESPIRATION RATE: 16 BRPM | TEMPERATURE: 99.4 F | SYSTOLIC BLOOD PRESSURE: 112 MMHG | WEIGHT: 133 LBS | HEART RATE: 57 BPM | OXYGEN SATURATION: 99 % | DIASTOLIC BLOOD PRESSURE: 76 MMHG | HEIGHT: 63 IN

## 2018-09-10 DIAGNOSIS — R30.0 DYSURIA: Primary | ICD-10-CM

## 2018-09-10 LAB
BILIRUB UR QL STRIP: NORMAL
GLUCOSE UR-MCNC: NORMAL MG/DL
HCG URINE, QL. (POC): NEGATIVE
KETONES P FAST UR STRIP-MCNC: NORMAL MG/DL
PH UR STRIP: 7 [PH] (ref 4.6–8)
PROT UR QL STRIP: NORMAL
SP GR UR STRIP: 1.01 (ref 1–1.03)
UA UROBILINOGEN AMB POC: NORMAL (ref 0.2–1)
URINALYSIS CLARITY POC: CLEAR
URINALYSIS COLOR POC: NORMAL
URINE BLOOD POC: NORMAL
URINE LEUKOCYTES POC: NORMAL
URINE NITRITES POC: POSITIVE
VALID INTERNAL CONTROL?: YES

## 2018-09-10 RX ORDER — SULFAMETHOXAZOLE AND TRIMETHOPRIM 800; 160 MG/1; MG/1
1 TABLET ORAL 2 TIMES DAILY
Qty: 6 TAB | Refills: 0 | Status: SHIPPED | OUTPATIENT
Start: 2018-09-10 | End: 2018-09-13

## 2018-09-10 NOTE — PROGRESS NOTES
Chief Complaint   Patient presents with    Dysuria     times 2 days     1. Have you been to the ER, urgent care clinic since your last visit? Hospitalized since your last visit? No    2. Have you seen or consulted any other health care providers outside of the [facility] since your last visit? Include any pap smears or colon screening.  No

## 2018-09-10 NOTE — PROGRESS NOTES
HPI     CC: dysuria     Therese Pan is a 41 y.o. female with hx of hypothyroidism who presents for dysuria. Endorses dysuria x 2 days. With frequency or urgency. Taking Azo OTC. No abdominal or back pain. No fevers or chills. No vaginal discharge. LMP 8/20/18. Is sexually active with 1 male partner; no condom use. PMHx - Reviewed  Past Medical History:   Diagnosis Date    Headache     Hypothyroidism        Meds - Reviewed  Current Outpatient Prescriptions   Medication Sig Dispense Refill    levothyroxine (SYNTHROID) 50 mcg tablet Take 1 Tab by mouth Daily (before breakfast) for 90 days. 90 Tab 1    SUMAtriptan (IMITREX) 50 mg tablet Take 1 Tab by mouth once as needed for Migraine. 10 Tab 1    butalbital-acetaminophen (PHRENILIN)  mg tablet Take 1 Tab by mouth every six (6) hours as needed. For migraine 10 Tab 1    clotrimazole-betamethasone (LOTRISONE) topical cream Apply thin layer twice daily for up to 2 weeks 15 g 0    clindamycin-benzoyl peroxide (BENZACLIN) 1-5 % topical gel Apply  to affected area two (2) times a day. Apply to affected area after the skin has been cleansed and dried. 1 Tube 3    diclofenac (VOLTAREN) 1 % gel Apply  to affected area four (4) times daily. 100 g 0       Allergies - Reviewed  No Known Allergies    Smoker - Reviewed  History   Smoking Status    Never Smoker   Smokeless Tobacco    Never Used       ETOH - Reviewed  History   Alcohol Use No       FH - Reviewed  Family History   Problem Relation Age of Onset    Heart Disease Father        ROS:  Review of Systems   Constitutional: Negative for activity change, appetite change, chills, diaphoresis, fatigue and fever. Respiratory: Negative for chest tightness and shortness of breath. Cardiovascular: Negative for chest pain and palpitations. Gastrointestinal: Negative for abdominal pain, nausea and vomiting. Genitourinary: Positive for dysuria, frequency and urgency.  Negative for difficulty urinating, dyspareunia, flank pain, menstrual problem, pelvic pain and vaginal bleeding. Neurological: Negative for dizziness, light-headedness and headaches. Physical Exam:  Visit Vitals    /76    Pulse (!) 57    Temp 99.4 °F (37.4 °C) (Oral)    Resp 16    Ht 5' 3\" (1.6 m)    Wt 133 lb (60.3 kg)    LMP 08/20/2018 (Approximate)    SpO2 99%    BMI 23.56 kg/m2       Wt Readings from Last 3 Encounters:   09/10/18 133 lb (60.3 kg)   08/24/18 125 lb (56.7 kg)   11/28/17 122 lb (55.3 kg)     BP Readings from Last 3 Encounters:   09/10/18 112/76   08/24/18 117/78   11/28/17 118/71        Physical Exam   Constitutional: She is oriented to person, place, and time. She appears well-developed and well-nourished. No distress. HENT:   Head: Normocephalic and atraumatic. Mouth/Throat: Oropharynx is clear and moist.   Eyes: No scleral icterus. Cardiovascular: Normal rate and regular rhythm. Pulmonary/Chest: Effort normal and breath sounds normal. No respiratory distress. She has no wheezes. Abdominal: Soft. She exhibits no distension. There is no tenderness. There is no guarding. No suprapubic or CVA tenderness   Neurological: She is alert and oriented to person, place, and time. She exhibits normal muscle tone. Coordination normal.   Skin: Skin is warm and dry. She is not diaphoretic. Psychiatric: She has a normal mood and affect. Her behavior is normal.   Nursing note and vitals reviewed.       Recent Results (from the past 12 hour(s))   AMB POC URINALYSIS DIP STICK AUTO W/O MICRO    Collection Time: 09/10/18  9:18 AM   Result Value Ref Range    Color (UA POC) Orange     Clarity (UA POC) Clear     Glucose (UA POC) Trace Negative    Bilirubin (UA POC) 3+ Negative    Ketones (UA POC) 1+ Negative    Specific gravity (UA POC) 1.015 1.001 - 1.035    Blood (UA POC) 3+ Negative    pH (UA POC) 7.0 4.6 - 8.0    Protein (UA POC) 3+ Negative    Urobilinogen (UA POC) 4 mg/dL 0.2 - 1 Nitrites (UA POC) Positive Negative    Leukocyte esterase (UA POC) 3+ Negative   AMB POC URINE PREGNANCY TEST, VISUAL COLOR COMPARISON    Collection Time: 09/10/18  9:18 AM   Result Value Ref Range    VALID INTERNAL CONTROL POC Yes     HCG urine, Ql. (POC) Negative Negative           Assessment     41 y.o. female presents with dysuria. Patient Active Problem List   Diagnosis Code    Acquired hypothyroidism E03.9    Fatigue R53.83    Migraine without aura and without status migrainosus, not intractable G43.009    Insomnia G47.00    Cystic acne L70.0    Attention and concentration deficit R41.840       Today's diagnoses are:    ICD-10-CM ICD-9-CM    1. Dysuria R30.0 788.1 AMB POC URINALYSIS DIP STICK AUTO W/O MICRO      AMB POC URINE PREGNANCY TEST, VISUAL COLOR COMPARISON      CULTURE, URINE      trimethoprim-sulfamethoxazole (BACTRIM DS, SEPTRA DS) 160-800 mg per tablet              Plan     1. UTI - p/w dysuria, frequency, urgency. Currently on Azo.  - POC UA with positive nitrites, 3+ leuks. - urine culture  - POC UPT neg  - Bactrim BID x 3d    Follow up as needed    Prior labs and imaging were reviewed. I have discussed the diagnosis with the patient and the intended plan as seen in the above orders. The patient has received an after-visit summary and questions were answered concerning future plans. I have discussed medication side effects and warnings with the patient as well. Patient discussed with Dr. Melba Andrews, Attending Physician.     Kimani Marion MD, PGY3  Family Medicine Resident

## 2018-09-12 LAB — BACTERIA UR CULT: ABNORMAL

## 2018-12-05 ENCOUNTER — OFFICE VISIT (OUTPATIENT)
Dept: FAMILY MEDICINE CLINIC | Age: 43
End: 2018-12-05

## 2018-12-05 VITALS
SYSTOLIC BLOOD PRESSURE: 143 MMHG | DIASTOLIC BLOOD PRESSURE: 91 MMHG | RESPIRATION RATE: 16 BRPM | TEMPERATURE: 96.5 F | WEIGHT: 130 LBS | HEIGHT: 63 IN | BODY MASS INDEX: 23.04 KG/M2 | HEART RATE: 61 BPM | OXYGEN SATURATION: 100 %

## 2018-12-05 DIAGNOSIS — R41.3 MEMORY CHANGE: ICD-10-CM

## 2018-12-05 DIAGNOSIS — E03.8 OTHER SPECIFIED HYPOTHYROIDISM: ICD-10-CM

## 2018-12-05 DIAGNOSIS — R53.83 FATIGUE, UNSPECIFIED TYPE: Primary | ICD-10-CM

## 2018-12-05 NOTE — PROGRESS NOTES
Laly Kee is a 41 y.o. female who presents for evaluation of fatigue and memory changes. She reports the sx are chronic over the last 4-5 years. She feels the memory changes are slowly worsening. Her main examples are leaving items in certain places in the house and not being able to find them. She also reports not being able to recall certain things that she has known in the past such as her zip code. She denies any head injury recently or in the past.  Her only medication is synthroid 50mcg daily. Her last TSH was WNL (2.230 on 8/24/18). She denies tobacco and recreational drugs. Rare alcohol use. She stopped drinking coffee about 2 months ago. She sleeps 7-8 hours a day. She reports exercising daily. No restrictions on her diet and she feels she has a healthy diet. LMP 1 week ago. H/o hypothyroidism and taking synthroid. TSH WNL 8/24/18. She wants to discuss switching to an alternative thyroid supplementation because she thinks the synthroid could be affecting her memory and fatigue. PMHx:  Past Medical History:   Diagnosis Date    Headache     Hypothyroidism        Meds:   Synthroid 50mcg daily     Allergies:   No Known Allergies    Smoker:  Social History     Tobacco Use   Smoking Status Never Smoker   Smokeless Tobacco Never Used       ETOH:   Social History     Substance and Sexual Activity   Alcohol Use No       FH:   Family History   Problem Relation Age of Onset    Heart Disease Father        ROS:  General/Constitutional:  fatigue,  No headache, fever, weight loss or weight gain       Eyes:   No  visual changes  Cardiac:    No chest pain      Respiratory:   No cough or shortness of breath     GI:   No nausea/vomiting, diarrhea, abdominal pain  Neurological:  Decreased memory.   No problems with balance, or unilateral weakness     Skin: No rash     Physical Exam:  Visit Vitals  BP (!) 143/91   Pulse 61   Temp 96.5 °F (35.8 °C)   Resp 16   Ht 5' 3\" (1.6 m)   Wt 130 lb (59 kg)   SpO2 100%   BMI 23.03 kg/m²     GEN: No apparent distress. Alert and oriented and responds to all questions appropriately. EYES:  Conjunctiva clear; pupils round and reactive to light; extraocular movements are intact. EAR: External ears are normal.   NOSE: Turbinates are within normal limits. No drainage  OROPHYARYNX: No oral lesions or exudates. NECK:  Supple; no masses; thyroid normal           LUNGS: Respirations unlabored; clear to auscultation bilaterally  CARDIOVASCULAR: Regular, rate, and rhythm without murmurs, gallops or rubs   ABDOMEN: Soft; nontender; nondistended; normoactive bowel sounds; no masses or organomegaly  NEUROLOGIC:  No focal neurologic deficits. CN II-XII intact. Finger to nose with eye closed intact. Negative pronator drift. Strength and sensation grossly intact. Coordination and gait grossly intact. EXT: Well perfused. No edema. Moving all ext equally. SKIN: No obvious rashes. Assessment:    ICD-10-CM ICD-9-CM    1. Fatigue, unspecified type R53.83 780.79 CBC WITH AUTOMATED DIFF      METABOLIC PANEL, COMPREHENSIVE      TSH 3RD GENERATION      VITAMIN D, 25 HYDROXY      IRON PROFILE   2. Other specified hypothyroidism E03.8 244.8 CBC WITH AUTOMATED DIFF      METABOLIC PANEL, COMPREHENSIVE      TSH 3RD GENERATION      VITAMIN D, 25 HYDROXY      IRON PROFILE   3. Memory change R41.3 780.93 CBC WITH AUTOMATED DIFF      METABOLIC PANEL, COMPREHENSIVE      TSH 3RD GENERATION      VITAMIN D, 25 HYDROXY      IRON PROFILE       Plan:  Fatigue: Uncertain etiology. Appears chronic. Will check labs as above. Hypothyroidism: Well controlled in the past with last TSH WNL 8/2018. Will check TSH. Recommended that she continue synthroid 50mcg daily at this time and would not recommend formulary change until further w/u of other causes of fatigue and memory change have been investegated. Memory changes: Chronic. Will get labs as above.   Referred to Neurology for further evaluation. RTC: She will f/u in clinic after her neurology appointment.

## 2018-12-06 LAB
25(OH)D3+25(OH)D2 SERPL-MCNC: 22 NG/ML (ref 30–100)
ALBUMIN SERPL-MCNC: 4.2 G/DL (ref 3.5–5.5)
ALBUMIN/GLOB SERPL: 1.3 {RATIO} (ref 1.2–2.2)
ALP SERPL-CCNC: 59 IU/L (ref 39–117)
ALT SERPL-CCNC: 14 IU/L (ref 0–32)
AST SERPL-CCNC: 25 IU/L (ref 0–40)
BASOPHILS # BLD AUTO: 0 X10E3/UL (ref 0–0.2)
BASOPHILS NFR BLD AUTO: 1 %
BILIRUB SERPL-MCNC: 0.2 MG/DL (ref 0–1.2)
BUN SERPL-MCNC: 11 MG/DL (ref 6–24)
BUN/CREAT SERPL: 13 (ref 9–23)
CALCIUM SERPL-MCNC: 9.1 MG/DL (ref 8.7–10.2)
CHLORIDE SERPL-SCNC: 102 MMOL/L (ref 96–106)
CO2 SERPL-SCNC: 24 MMOL/L (ref 20–29)
CREAT SERPL-MCNC: 0.85 MG/DL (ref 0.57–1)
EOSINOPHIL # BLD AUTO: 0 X10E3/UL (ref 0–0.4)
EOSINOPHIL NFR BLD AUTO: 1 %
ERYTHROCYTE [DISTWIDTH] IN BLOOD BY AUTOMATED COUNT: 13.1 % (ref 12.3–15.4)
GLOBULIN SER CALC-MCNC: 3.3 G/DL (ref 1.5–4.5)
GLUCOSE SERPL-MCNC: 95 MG/DL (ref 65–99)
HCT VFR BLD AUTO: 35.3 % (ref 34–46.6)
HGB BLD-MCNC: 12.2 G/DL (ref 11.1–15.9)
IMM GRANULOCYTES # BLD: 0 X10E3/UL (ref 0–0.1)
IMM GRANULOCYTES NFR BLD: 0 %
IRON SATN MFR SERPL: 24 % (ref 15–55)
IRON SERPL-MCNC: 74 UG/DL (ref 27–159)
LYMPHOCYTES # BLD AUTO: 2.1 X10E3/UL (ref 0.7–3.1)
LYMPHOCYTES NFR BLD AUTO: 29 %
MCH RBC QN AUTO: 31.4 PG (ref 26.6–33)
MCHC RBC AUTO-ENTMCNC: 34.6 G/DL (ref 31.5–35.7)
MCV RBC AUTO: 91 FL (ref 79–97)
MONOCYTES # BLD AUTO: 0.7 X10E3/UL (ref 0.1–0.9)
MONOCYTES NFR BLD AUTO: 9 %
NEUTROPHILS # BLD AUTO: 4.4 X10E3/UL (ref 1.4–7)
NEUTROPHILS NFR BLD AUTO: 60 %
PLATELET # BLD AUTO: 311 X10E3/UL (ref 150–379)
POTASSIUM SERPL-SCNC: 4.4 MMOL/L (ref 3.5–5.2)
PROT SERPL-MCNC: 7.5 G/DL (ref 6–8.5)
RBC # BLD AUTO: 3.89 X10E6/UL (ref 3.77–5.28)
SODIUM SERPL-SCNC: 139 MMOL/L (ref 134–144)
TIBC SERPL-MCNC: 311 UG/DL (ref 250–450)
TSH SERPL DL<=0.005 MIU/L-ACNC: 2.94 UIU/ML (ref 0.45–4.5)
UIBC SERPL-MCNC: 237 UG/DL (ref 131–425)
WBC # BLD AUTO: 7.3 X10E3/UL (ref 3.4–10.8)

## 2018-12-07 DIAGNOSIS — E55.9 VITAMIN D DEFICIENCY: Primary | ICD-10-CM

## 2018-12-07 RX ORDER — ERGOCALCIFEROL 1.25 MG/1
50000 CAPSULE ORAL
Qty: 8 CAP | Refills: 0 | Status: SHIPPED | OUTPATIENT
Start: 2018-12-07 | End: 2019-01-26

## 2018-12-18 ENCOUNTER — TELEPHONE (OUTPATIENT)
Dept: FAMILY MEDICINE CLINIC | Age: 43
End: 2018-12-18

## 2018-12-18 NOTE — TELEPHONE ENCOUNTER
----- Message from Anahi Reilly sent at 12/18/2018  9:23 AM EST -----  Regarding: Dr. José Fears telephone  Pt is requesting a call back in regards to lab results.  Best contact [phone]

## 2018-12-21 NOTE — TELEPHONE ENCOUNTER
Pt notified Letter sent. Domitila Jameel sent a message asking to call her and tell her she has Vit D def and to start the supplement.

## 2019-01-11 ENCOUNTER — TELEPHONE (OUTPATIENT)
Dept: FAMILY MEDICINE CLINIC | Age: 44
End: 2019-01-11

## 2019-01-11 NOTE — TELEPHONE ENCOUNTER
----- Message from Christel Brumfield sent at 1/11/2019  1:54 PM EST -----  Regarding: Dr. Renetta Linares would like a call back in reference to her referral date on her form for her nurologist. Phone: [phone]

## 2019-04-09 DIAGNOSIS — E03.9 ACQUIRED HYPOTHYROIDISM: ICD-10-CM

## 2019-04-09 NOTE — TELEPHONE ENCOUNTER
Requested Prescriptions     Pending Prescriptions Disp Refills    butalbital-acetaminophen (PHRENILIN)  mg tablet 10 Tab 1     Sig: Take 1 Tab by mouth every six (6) hours as needed. For migraine    SUMAtriptan (IMITREX) 50 mg tablet 10 Tab 1     Sig: Take 1 Tab by mouth once as needed for Migraine for up to 1 dose.  levothyroxine (SYNTHROID) 50 mcg tablet 90 Tab 1     Sig: Take 1 Tab by mouth Daily (before breakfast) for 90 days. Patient is out of medication for the first two and has about 4 days of the levothyroxine.

## 2019-04-10 NOTE — TELEPHONE ENCOUNTER
Dr. Jayne Alafro, can you assist with refills? Neither Dr. Sydney Boston, who saw patient in December, or Dr. Lul Aden are available. Thank you.

## 2019-04-17 RX ORDER — LEVOTHYROXINE SODIUM 50 UG/1
50 TABLET ORAL
Qty: 90 TAB | Refills: 1 | Status: SHIPPED | OUTPATIENT
Start: 2019-04-17 | End: 2019-04-18 | Stop reason: SDUPTHER

## 2019-04-17 RX ORDER — SUMATRIPTAN 50 MG/1
50 TABLET, FILM COATED ORAL
Qty: 10 TAB | Refills: 0 | Status: SHIPPED | OUTPATIENT
Start: 2019-04-17 | End: 2019-04-18 | Stop reason: SDUPTHER

## 2019-04-17 RX ORDER — BUTALBITAL AND ACETAMINOPHEN 325; 50 MG/1; MG/1
1 TABLET ORAL
Qty: 10 TAB | Refills: 0 | Status: SHIPPED | OUTPATIENT
Start: 2019-04-17 | End: 2019-04-18 | Stop reason: SDUPTHER

## 2019-04-17 NOTE — TELEPHONE ENCOUNTER
Patient called to check status, spoke to Ariella for assistance. Please call patient back asap when addressed to inform. [phone].

## 2019-04-18 DIAGNOSIS — E03.9 ACQUIRED HYPOTHYROIDISM: ICD-10-CM

## 2019-04-18 RX ORDER — SUMATRIPTAN 50 MG/1
50 TABLET, FILM COATED ORAL
Qty: 10 TAB | Refills: 0 | Status: SHIPPED | OUTPATIENT
Start: 2019-04-18 | End: 2019-04-18

## 2019-04-18 RX ORDER — LEVOTHYROXINE SODIUM 50 UG/1
50 TABLET ORAL
Qty: 90 TAB | Refills: 1 | Status: SHIPPED | OUTPATIENT
Start: 2019-04-18 | End: 2019-07-17

## 2019-04-18 RX ORDER — BUTALBITAL AND ACETAMINOPHEN 325; 50 MG/1; MG/1
1 TABLET ORAL
Qty: 10 TAB | Refills: 0 | Status: SHIPPED | OUTPATIENT
Start: 2019-04-18 | End: 2019-04-24 | Stop reason: SDUPTHER

## 2019-04-18 NOTE — TELEPHONE ENCOUNTER
Waiting for patient to call back in regards to correct pharmacy she wants these medications to go to.     I have the script in my office

## 2019-04-24 RX ORDER — BUTALBITAL AND ACETAMINOPHEN 325; 50 MG/1; MG/1
1 TABLET ORAL
Qty: 10 TAB | Refills: 0 | Status: SHIPPED | OUTPATIENT
Start: 2019-04-24

## 2019-04-24 RX ORDER — BUTALBITAL, ACETAMINOPHEN AND CAFFEINE 50; 325; 40 MG/1; MG/1; MG/1
1 TABLET ORAL
Qty: 10 TAB | Refills: 0 | Status: SHIPPED | OUTPATIENT
Start: 2019-04-24

## 2019-04-24 NOTE — TELEPHONE ENCOUNTER
Spoke with the [facility] pharmacy and they do not carry the medication. Spoke with patient and resent the Rx To [facility] at this time.

## 2019-04-24 NOTE — TELEPHONE ENCOUNTER
Spoke with Dr.Truong Mayfield to send in the medication that [facility]. New Rx sent over at this time.

## 2019-04-24 NOTE — TELEPHONE ENCOUNTER
----- Message from Emily Kaye sent at 4/24/2019 12:22 PM EDT -----  Regarding: Dr. Priya Champion   Pt is inquiring about a Rx that was on a orginial order for her but did not go over to the [facility] on file. The Rx was for sumatriptan succinate. Best contact number is [phone].

## 2019-04-24 NOTE — TELEPHONE ENCOUNTER
Called patient back to clarify. Patient states she did not receive the butalbital-acetaminophen (Breann Groves). I informed her we show that it went to the pharmacy. Patient will contact pharmacy.

## 2019-04-24 NOTE — TELEPHONE ENCOUNTER
Timmy Every MD/ telephone   Received: Today   Message Contents   Dao, [address]             Pt would like  Sit to call her back.  Pt was returning a call  Pts contact [phone]

## 2019-05-16 ENCOUNTER — OFFICE VISIT (OUTPATIENT)
Dept: FAMILY MEDICINE CLINIC | Age: 44
End: 2019-05-16

## 2019-05-16 VITALS
RESPIRATION RATE: 16 BRPM | SYSTOLIC BLOOD PRESSURE: 135 MMHG | TEMPERATURE: 98.5 F | HEART RATE: 52 BPM | DIASTOLIC BLOOD PRESSURE: 91 MMHG | BODY MASS INDEX: 22.36 KG/M2 | HEIGHT: 63 IN | OXYGEN SATURATION: 99 % | WEIGHT: 126.2 LBS

## 2019-05-16 DIAGNOSIS — E55.9 VITAMIN D DEFICIENCY: Primary | ICD-10-CM

## 2019-05-16 NOTE — PROGRESS NOTES
Subjective Chief complaint:  Vitamin D level recheck Dion Lu is an 45 y.o. female with a hx of vit D deficiency here for recheck. Reports fatigue. Stopped taking vit d supplements a few months ago. Denies chest pain, sob, dizziness. Allergies - reviewed:  
No Known Allergies Medications - reviewed:  
Current Outpatient Medications Medication Sig  butalbital-acetaminophen (PHRENILIN)  mg tablet Take 1 Tab by mouth every six (6) hours as needed (migraine). For migraine  butalbital-acetaminophen-caffeine (FIORICET, ESGIC) -40 mg per tablet Take 1 Tab by mouth every six (6) hours as needed for Pain.  levothyroxine (SYNTHROID) 50 mcg tablet Take 1 Tab by mouth Daily (before breakfast) for 90 days.  clotrimazole-betamethasone (LOTRISONE) topical cream Apply thin layer twice daily for up to 2 weeks  clindamycin-benzoyl peroxide (BENZACLIN) 1-5 % topical gel Apply  to affected area two (2) times a day. Apply to affected area after the skin has been cleansed and dried.  diclofenac (VOLTAREN) 1 % gel Apply  to affected area four (4) times daily. No current facility-administered medications for this visit. Past Medical History - reviewed: 
Past Medical History:  
Diagnosis Date  Headache  Hypothyroidism Immunizations - reviewed: There is no immunization history on file for this patient. ROS Review of Systems: See HPI. Physical Exam 
Visit Vitals BP (!) 135/91 (BP 1 Location: Right arm, BP Patient Position: Sitting) Pulse (!) 52 Temp 98.5 °F (36.9 °C) (Oral) Resp 16 Ht 5' 3\" (1.6 m) Wt 126 lb 3.2 oz (57.2 kg) SpO2 99% BMI 22.36 kg/m² General appearance - Alert, NAD. Head: Atraumatic. Normocephalic. Respiratory - LCTAB. No wheeze/rale/rhonchi Heart - Bradycardia regular rhythm. No m/r/r Abdomen - Soft, non tender. Non distended. Neurological - No focal deficits.  Speech normal.  
 
 Assessment/Plan 1. Vitamin D deficiency: Labs as below. - VITAMIN D, 25 HYDROXY I discussed the aforementioned diagnoses with the patient as well as the plan of care. Sherlyn Garcia MD 
Family Medicine Resident PGY 3

## 2019-05-16 NOTE — PROGRESS NOTES
46 yo female here for recheck of vitamin D deficiency I reviewed with the resident the medical history and the resident's findings on the physical examination. I discussed with the resident the patient's diagnosis and concur with the plan.

## 2019-05-17 LAB — 25(OH)D3+25(OH)D2 SERPL-MCNC: 22.2 NG/ML (ref 30–100)

## 2019-05-29 ENCOUNTER — TELEPHONE (OUTPATIENT)
Dept: FAMILY MEDICINE CLINIC | Age: 44
End: 2019-05-29

## 2019-05-29 NOTE — TELEPHONE ENCOUNTER
[phone]    Patient called for the lab results of 5/16. Flaca Steward this has been a long time in waiting for them.     Urgent

## 2022-03-19 PROBLEM — E55.9 VITAMIN D DEFICIENCY: Status: ACTIVE | Noted: 2018-12-07

## 2023-05-25 RX ORDER — BUTALBITAL/ACETAMINOPHEN 50MG-325MG
1 TABLET ORAL EVERY 6 HOURS PRN
COMMUNITY
Start: 2019-04-24

## 2023-05-25 RX ORDER — BUTALBITAL, ACETAMINOPHEN AND CAFFEINE 50; 325; 40 MG/1; MG/1; MG/1
1 TABLET ORAL EVERY 6 HOURS PRN
COMMUNITY
Start: 2019-04-24

## 2023-05-25 RX ORDER — CLOTRIMAZOLE AND BETAMETHASONE DIPROPIONATE 10; .64 MG/G; MG/G
CREAM TOPICAL
COMMUNITY
Start: 2017-11-17

## 2023-05-25 RX ORDER — CLINDAMYCIN AND BENZOYL PEROXIDE 10; 50 MG/G; MG/G
GEL TOPICAL 2 TIMES DAILY
COMMUNITY
Start: 2017-05-26